# Patient Record
Sex: FEMALE | Race: WHITE | NOT HISPANIC OR LATINO | Employment: OTHER | URBAN - METROPOLITAN AREA
[De-identification: names, ages, dates, MRNs, and addresses within clinical notes are randomized per-mention and may not be internally consistent; named-entity substitution may affect disease eponyms.]

---

## 2024-12-22 DIAGNOSIS — E03.8 SECONDARY HYPOTHYROIDISM: ICD-10-CM

## 2024-12-23 RX ORDER — LEVOTHYROXINE SODIUM 137 UG/1
137 TABLET ORAL DAILY
Qty: 90 TABLET | Refills: 0 | Status: SHIPPED | OUTPATIENT
Start: 2024-12-23

## 2025-03-26 ENCOUNTER — APPOINTMENT (OUTPATIENT)
Dept: PRIMARY CARE | Facility: CLINIC | Age: 70
End: 2025-03-26
Payer: MEDICARE

## 2025-03-26 VITALS
DIASTOLIC BLOOD PRESSURE: 66 MMHG | HEART RATE: 79 BPM | BODY MASS INDEX: 21.71 KG/M2 | WEIGHT: 118 LBS | HEIGHT: 62 IN | SYSTOLIC BLOOD PRESSURE: 111 MMHG | OXYGEN SATURATION: 95 % | TEMPERATURE: 96.2 F

## 2025-03-26 DIAGNOSIS — E03.9 ACQUIRED HYPOTHYROIDISM: ICD-10-CM

## 2025-03-26 DIAGNOSIS — Z78.0 POSTMENOPAUSAL STATE: ICD-10-CM

## 2025-03-26 DIAGNOSIS — R73.03 PREDIABETES: ICD-10-CM

## 2025-03-26 DIAGNOSIS — Z00.00 MEDICARE ANNUAL WELLNESS VISIT, SUBSEQUENT: Primary | ICD-10-CM

## 2025-03-26 DIAGNOSIS — Z11.59 NEED FOR HEPATITIS C SCREENING TEST: ICD-10-CM

## 2025-03-26 DIAGNOSIS — Z12.11 COLON CANCER SCREENING: ICD-10-CM

## 2025-03-26 DIAGNOSIS — Z12.31 ENCOUNTER FOR SCREENING MAMMOGRAM FOR BREAST CANCER: ICD-10-CM

## 2025-03-26 PROCEDURE — 1159F MED LIST DOCD IN RCRD: CPT | Performed by: FAMILY MEDICINE

## 2025-03-26 PROCEDURE — 1160F RVW MEDS BY RX/DR IN RCRD: CPT | Performed by: FAMILY MEDICINE

## 2025-03-26 PROCEDURE — 3008F BODY MASS INDEX DOCD: CPT | Performed by: FAMILY MEDICINE

## 2025-03-26 PROCEDURE — 99212 OFFICE O/P EST SF 10 MIN: CPT | Performed by: FAMILY MEDICINE

## 2025-03-26 PROCEDURE — 1123F ACP DISCUSS/DSCN MKR DOCD: CPT | Performed by: FAMILY MEDICINE

## 2025-03-26 PROCEDURE — G0439 PPPS, SUBSEQ VISIT: HCPCS | Performed by: FAMILY MEDICINE

## 2025-03-26 PROCEDURE — 1158F ADVNC CARE PLAN TLK DOCD: CPT | Performed by: FAMILY MEDICINE

## 2025-03-26 PROCEDURE — 1036F TOBACCO NON-USER: CPT | Performed by: FAMILY MEDICINE

## 2025-03-26 PROCEDURE — 1170F FXNL STATUS ASSESSED: CPT | Performed by: FAMILY MEDICINE

## 2025-03-26 RX ORDER — ATORVASTATIN CALCIUM 40 MG/1
40 TABLET, FILM COATED ORAL
COMMUNITY
Start: 2025-01-19

## 2025-03-26 RX ORDER — AMLODIPINE BESYLATE 5 MG/1
1 TABLET ORAL 2 TIMES DAILY
COMMUNITY

## 2025-03-26 RX ORDER — CARVEDILOL 12.5 MG/1
1 TABLET ORAL
COMMUNITY

## 2025-03-26 RX ORDER — CLONIDINE HYDROCHLORIDE 0.2 MG/1
0.2 TABLET ORAL
COMMUNITY
Start: 2025-01-17

## 2025-03-26 ASSESSMENT — COLUMBIA-SUICIDE SEVERITY RATING SCALE - C-SSRS
6. HAVE YOU EVER DONE ANYTHING, STARTED TO DO ANYTHING, OR PREPARED TO DO ANYTHING TO END YOUR LIFE?: NO
2. HAVE YOU ACTUALLY HAD ANY THOUGHTS OF KILLING YOURSELF?: NO
1. IN THE PAST MONTH, HAVE YOU WISHED YOU WERE DEAD OR WISHED YOU COULD GO TO SLEEP AND NOT WAKE UP?: NO

## 2025-03-26 ASSESSMENT — PATIENT HEALTH QUESTIONNAIRE - PHQ9
1. LITTLE INTEREST OR PLEASURE IN DOING THINGS: NOT AT ALL
2. FEELING DOWN, DEPRESSED OR HOPELESS: NOT AT ALL
SUM OF ALL RESPONSES TO PHQ9 QUESTIONS 1 AND 2: 0

## 2025-03-26 ASSESSMENT — ACTIVITIES OF DAILY LIVING (ADL)
GROCERY_SHOPPING: INDEPENDENT
DOING_HOUSEWORK: INDEPENDENT
BATHING: INDEPENDENT
MANAGING_FINANCES: INDEPENDENT
TAKING_MEDICATION: INDEPENDENT
DRESSING: INDEPENDENT

## 2025-03-26 ASSESSMENT — ENCOUNTER SYMPTOMS
LOSS OF SENSATION IN FEET: 0
DEPRESSION: 0
OCCASIONAL FEELINGS OF UNSTEADINESS: 0

## 2025-03-26 NOTE — PROGRESS NOTES
Subjective   Reason for Visit: Vanessa Schroeder is an 69 y.o. female here for a Medicare Wellness visit.     Past Medical, Surgical, and Family History reviewed and updated in chart.    Reviewed all medications by prescribing practitioner or clinical pharmacist (such as prescriptions, OTCs, herbal therapies and supplements) and documented in the medical record.     Medicare annual wellness exam and follow-up hypothyroidism  Has peripheral vascular disease and heart disease sees cardiology  Recently quit smoking  Feels well no complaints    Thyroid Problem  Presents for follow-up visit. The symptoms have been stable.       Patient Self Assessment of Health Status  Patient Self Assessment: Fair    Nutrition and Exercise  Current Diet: Unhealthy Diet  Adequate Fluid Intake: Yes  Caffeine: Yes  Exercise Frequency: Infrequently    Functional Ability/Level of Safety  Cognitive Impairment Observed: No cognitive impairment observed  Cognitive Impairment Reported: No cognitive impairment reported by patient or family    Home Safety Risk Factors: None         Patient Care Team:  Laura Castro MD as PCP - General     Review of Systems  Constitutional: no chills, no fever and no night sweats.   Eyes: no blurred vision and no eyesight problems.   ENT: no hearing loss, no nasal congestion, no nasal discharge, no hoarseness and no sore throat.   Cardiovascular: no chest pain, no intermittent leg claudication, no lower extremity edema, no palpitations and no syncope.   Respiratory: no cough, no shortness of breath during exertion, no shortness of breath at rest and no wheezing.   Gastrointestinal: no abdominal pain, no blood in stools, no constipation, no diarrhea, no melena, no nausea, no rectal pain and no vomiting.   Genitourinary: no dysuria, no change in urinary frequency, no urinary hesitancy, no feelings of urinary urgency and no vaginal discharge.   Musculoskeletal: no arthralgias,  no back pain and no myalgias.  "  Integumentary: no new skin lesions and no rashes.   Neurological: no difficulty walking, no headache, no limb weakness, no numbness and no tingling.   Psychiatric: no anxiety, no depression, no anhedonia and no substance use disorders.   Endocrine: no recent weight gain and no recent weight loss.   Hematologic/Lymphatic: no tendency for easy bruising and no swollen glands .    Medicare Wellness Billing Compliance Satisfied    *This is a visual tool to show completion of required items on the day of the visit. Green checks will only appear on the date of visit.      Objective   Vitals:  /66   Pulse 79   Temp 35.7 °C (96.2 °F)   Ht 1.575 m (5' 2\")   Wt 53.5 kg (118 lb)   SpO2 95%   BMI 21.58 kg/m²       Physical Exam  The patient appeared well nourished and normally developed. Vital signs as documented. Head exam is unremarkable. No scleral icterus or corneal arcus noted.  Pupils are equal round reactive to light extraocular movements are intact no hemorrhages noted on funduscopic exam mouth mucous membranes are moist no exudates ears canals clear TMs are gray pearly not injected nose no rhinorrhea or epistaxis Neck is without jugular venous distension, thyromegaly, or carotid bruits. Carotid upstrokes are brisk bilaterally. Lungs are clear to auscultation and percussion. Cardiac exam reveals the PMI to be normally sized and situated. Rhythm is regular. First and second heart sounds normal. No murmurs, rubs or gallops. Abdominal exam reveals normal bowel sounds, no masses, no organomegaly and no aortic enlargement. Extremities are nonedematous and both femoral and pedal pulses are normal.  Neurologic exam DTRs are equal bilaterally no focal deficits strength is symmetrical heme lymph no palpable lymph nodes in the neck axilla or groin    Assessment/Plan   Problem List Items Addressed This Visit       Medicare annual wellness visit, subsequent - Primary    Acquired hypothyroidism    Relevant Orders    TSH " (Completed)     Other Visit Diagnoses       Postmenopausal state        Relevant Orders    XR DEXA bone density    Encounter for screening mammogram for breast cancer        Relevant Orders    BI mammo bilateral screening tomosynthesis    Need for hepatitis C screening test        Relevant Orders    Hepatitis C antibody (Completed)

## 2025-03-27 DIAGNOSIS — E03.9 ACQUIRED HYPOTHYROIDISM: Primary | ICD-10-CM

## 2025-03-27 LAB
HCV AB SERPL QL IA: NORMAL
TSH SERPL-ACNC: 0.03 MIU/L (ref 0.4–4.5)

## 2025-03-27 RX ORDER — LEVOTHYROXINE SODIUM 125 UG/1
125 TABLET ORAL DAILY
Qty: 90 TABLET | Refills: 3 | Status: SHIPPED | OUTPATIENT
Start: 2025-03-27 | End: 2026-03-27

## 2025-03-28 ENCOUNTER — TELEPHONE (OUTPATIENT)
Dept: PRIMARY CARE | Facility: CLINIC | Age: 70
End: 2025-03-28
Payer: MEDICARE

## 2025-03-28 NOTE — TELEPHONE ENCOUNTER
----- Message from Laura Castro sent at 3/27/2025  3:15 PM EDT -----  Please call Vanessa and tell her the thyroid blood test shows she is overmedicated   I will reduce her dosage and I would like her to recheck her thyroid level in 6 weeks

## 2025-03-31 PROBLEM — I73.9 PAD (PERIPHERAL ARTERY DISEASE) (CMS-HCC): Status: ACTIVE | Noted: 2025-03-31

## 2025-03-31 PROBLEM — R09.89 CAROTID BRUIT: Status: ACTIVE | Noted: 2025-03-31

## 2025-03-31 PROBLEM — E03.9 HYPOTHYROIDISM: Status: RESOLVED | Noted: 2025-03-31 | Resolved: 2025-03-31

## 2025-03-31 PROBLEM — S93.05XA DISLOCATION OF LEFT ANKLE JOINT: Status: RESOLVED | Noted: 2025-01-17 | Resolved: 2025-03-31

## 2025-03-31 PROBLEM — E78.5 HYPERLIPIDEMIA: Status: RESOLVED | Noted: 2025-03-31 | Resolved: 2025-03-31

## 2025-03-31 PROBLEM — S82.852A TRIMALLEOLAR FRACTURE OF LEFT ANKLE: Status: RESOLVED | Noted: 2025-01-17 | Resolved: 2025-03-31

## 2025-03-31 PROBLEM — I70.209: Status: RESOLVED | Noted: 2025-01-18 | Resolved: 2025-03-31

## 2025-03-31 PROBLEM — F40.243 FEAR OF FLYING: Status: RESOLVED | Noted: 2025-03-31 | Resolved: 2025-03-31

## 2025-03-31 PROBLEM — R73.03 PREDIABETES: Status: ACTIVE | Noted: 2025-03-31

## 2025-03-31 PROBLEM — M54.2 NECK PAIN: Status: RESOLVED | Noted: 2025-03-31 | Resolved: 2025-03-31

## 2025-03-31 PROBLEM — I10 HYPERTENSION: Status: RESOLVED | Noted: 2025-03-31 | Resolved: 2025-03-31

## 2025-03-31 PROBLEM — I25.10 CAD (CORONARY ARTERY DISEASE): Status: ACTIVE | Noted: 2025-03-31

## 2025-03-31 PROBLEM — I74.09 AORTOILIAC OCCLUSIVE DISEASE (MULTI): Status: RESOLVED | Noted: 2025-01-18 | Resolved: 2025-03-31

## 2025-03-31 PROBLEM — I73.9 CLAUDICATION: Status: RESOLVED | Noted: 2025-01-18 | Resolved: 2025-03-31

## 2025-03-31 PROBLEM — E03.9 ACQUIRED HYPOTHYROIDISM: Status: ACTIVE | Noted: 2025-03-31

## 2025-03-31 PROBLEM — R73.09 INCREASED GLUCOSE LEVEL: Status: RESOLVED | Noted: 2025-03-31 | Resolved: 2025-03-31

## 2025-03-31 PROBLEM — Z00.00 MEDICARE ANNUAL WELLNESS VISIT, SUBSEQUENT: Status: ACTIVE | Noted: 2025-03-31

## 2025-03-31 ASSESSMENT — PATIENT HEALTH QUESTIONNAIRE - PHQ9
2. FEELING DOWN, DEPRESSED OR HOPELESS: NOT AT ALL
1. LITTLE INTEREST OR PLEASURE IN DOING THINGS: NOT AT ALL
SUM OF ALL RESPONSES TO PHQ9 QUESTIONS 1 AND 2: 0

## 2025-03-31 ASSESSMENT — ACTIVITIES OF DAILY LIVING (ADL)
DOING_HOUSEWORK: INDEPENDENT
DRESSING: INDEPENDENT
GROCERY_SHOPPING: INDEPENDENT
TAKING_MEDICATION: INDEPENDENT
MANAGING_FINANCES: INDEPENDENT
BATHING: INDEPENDENT